# Patient Record
Sex: MALE | Race: BLACK OR AFRICAN AMERICAN | NOT HISPANIC OR LATINO | URBAN - METROPOLITAN AREA
[De-identification: names, ages, dates, MRNs, and addresses within clinical notes are randomized per-mention and may not be internally consistent; named-entity substitution may affect disease eponyms.]

---

## 2020-10-27 ENCOUNTER — OFFICE VISIT (OUTPATIENT)
Dept: URBAN - METROPOLITAN AREA CLINIC 128 | Facility: CLINIC | Age: 30
End: 2020-10-27
Payer: COMMERCIAL

## 2020-10-27 ENCOUNTER — WEB ENCOUNTER (OUTPATIENT)
Dept: URBAN - METROPOLITAN AREA CLINIC 128 | Facility: CLINIC | Age: 30
End: 2020-10-27

## 2020-10-27 ENCOUNTER — DASHBOARD ENCOUNTERS (OUTPATIENT)
Age: 30
End: 2020-10-27

## 2020-10-27 DIAGNOSIS — R10.9 ABDOMINAL PAIN: ICD-10-CM

## 2020-10-27 DIAGNOSIS — N13.30 HYDRONEPHROSIS: ICD-10-CM

## 2020-10-27 DIAGNOSIS — K26.5 PERFORATED DUODENAL ULCER: ICD-10-CM

## 2020-10-27 PROBLEM — 34602004: Status: ACTIVE | Noted: 2020-10-27

## 2020-10-27 PROCEDURE — G8484 FLU IMMUNIZE NO ADMIN: HCPCS | Performed by: PHYSICIAN ASSISTANT

## 2020-10-27 PROCEDURE — G8427 DOCREV CUR MEDS BY ELIG CLIN: HCPCS | Performed by: PHYSICIAN ASSISTANT

## 2020-10-27 PROCEDURE — 99203 OFFICE O/P NEW LOW 30 MIN: CPT | Performed by: PHYSICIAN ASSISTANT

## 2020-10-27 PROCEDURE — G9902 PT SCRN TBCO AND ID AS USER: HCPCS | Performed by: PHYSICIAN ASSISTANT

## 2020-10-27 PROCEDURE — G8420 CALC BMI NORM PARAMETERS: HCPCS | Performed by: PHYSICIAN ASSISTANT

## 2020-10-27 NOTE — HPI-OTHER HISTORIES
The patient was admitted to [facility] from 8/31/2020-9/4/2020 due to a perforated duodenal ulcer. An NG tube was placed and rose patient was treated medically with IV fluids and IV antibiotics. He was seen by surgeon dr. Powers and was treated medically as he was hemodynaimcally stable and had minial abdominal pain. NO surgery was performed. He had no luekocytosis. His upper GI and abdominal CT scan several days later revealed no evidence of oral dye extravasation. He tolerated oral GI soft diet and was afebrile and discharged home.  Initially his ct scan of abd/pelvic revealed pneumoperitoneum and significant chahge with dense consolidative pneumonia within the right lower lobe, small left pleural effusion, and moderate hydronephrosis and ureterectasis proximally on the right was noted. Today in the office the patient offers no abdominal pain, nausea, omiting, heartburn. he states he was supposed to have an EGD done in 6 weeks but has not been able to get in until now.

## 2020-11-06 ENCOUNTER — OFFICE VISIT (OUTPATIENT)
Dept: URBAN - METROPOLITAN AREA SURGERY CENTER 31 | Facility: SURGERY CENTER | Age: 30
End: 2020-11-06

## 2020-11-30 ENCOUNTER — OFFICE VISIT (OUTPATIENT)
Dept: URBAN - METROPOLITAN AREA SURGERY CENTER 31 | Facility: SURGERY CENTER | Age: 30
End: 2020-11-30
Payer: COMMERCIAL

## 2020-11-30 DIAGNOSIS — K29.60 ADENOPAPILLOMATOSIS GASTRICA: ICD-10-CM

## 2020-11-30 PROCEDURE — G8907 PT DOC NO EVENTS ON DISCHARG: HCPCS | Performed by: INTERNAL MEDICINE

## 2020-11-30 PROCEDURE — 43239 EGD BIOPSY SINGLE/MULTIPLE: CPT | Performed by: INTERNAL MEDICINE
